# Patient Record
Sex: FEMALE | Race: WHITE | Employment: OTHER | URBAN - METROPOLITAN AREA
[De-identification: names, ages, dates, MRNs, and addresses within clinical notes are randomized per-mention and may not be internally consistent; named-entity substitution may affect disease eponyms.]

---

## 2019-03-05 ENCOUNTER — HOSPITAL ENCOUNTER (EMERGENCY)
Facility: CLINIC | Age: 64
Discharge: HOME | End: 2019-03-06
Attending: EMERGENCY MEDICINE | Admitting: EMERGENCY MEDICINE
Payer: MEDICARE

## 2019-03-05 VITALS
DIASTOLIC BLOOD PRESSURE: 61 MMHG | SYSTOLIC BLOOD PRESSURE: 120 MMHG | TEMPERATURE: 98.2 F | HEART RATE: 96 BPM | RESPIRATION RATE: 18 BRPM | BODY MASS INDEX: 32.14 KG/M2 | HEIGHT: 66 IN | WEIGHT: 200 LBS | OXYGEN SATURATION: 97 %

## 2019-03-05 DIAGNOSIS — K59.03 DRUG-INDUCED CONSTIPATION: ICD-10-CM

## 2019-03-05 PROCEDURE — 99283 EMERGENCY DEPT VISIT LOW MDM: CPT

## 2019-03-05 ASSESSMENT — MIFFLIN-ST. JEOR: SCORE: 1478.94

## 2019-03-05 NOTE — ED AVS SNAPSHOT
Emergency Department  [address] [place] [address]  Phone:  [phone]  Fax:  [phone]                                    Apurva Merida   MRN: [id number]    Department:   Emergency Department   Date of Visit:  3/5/2019           After Visit Summary Signature Page    I have received my discharge instructions, and my questions have been answered. I have discussed any challenges I see with this plan with the nurse or doctor.    ..........................................................................................................................................  Patient/Patient Representative Signature      ..........................................................................................................................................  Patient Representative Print Name and Relationship to Patient    ..................................................               ................................................  Date                                   Time    ..........................................................................................................................................  Reviewed by Signature/Title    ...................................................              ..............................................  Date                                               Time          22EPIC Rev 08/18

## 2019-03-06 RX ORDER — DIAZEPAM 10 MG/2ML
5 INJECTION, SOLUTION INTRAMUSCULAR; INTRAVENOUS ONCE
Status: DISCONTINUED | OUTPATIENT
Start: 2019-03-06 | End: 2019-03-06

## 2019-03-06 ASSESSMENT — ENCOUNTER SYMPTOMS
FEVER: 0
VOMITING: 0
NAUSEA: 0
CONSTIPATION: 1
ABDOMINAL PAIN: 0
CHILLS: 0

## 2019-03-06 NOTE — DISCHARGE INSTRUCTIONS
Please take miralax twice daily until your see your PCP in 2-3 days.  If you have good bowel movements you may titrate the miralax down to once a day.    Drink plenty of water.      Return to the ED if unable to tolerate PO, abdominal pain, intractable nausea or vomiting, fevers or other acute changes.      Discharge Instructions  Constipation  Constipation can cause severe cramping pain and your provider thinks this might be the cause of your abdominal pain (belly pain) today.  People usually recognize that they are constipated because they have difficulty having bowel movements, are not having bowel movements frequently enough, or are not having large enough bowel movements. Sometimes, especially in children or older people, you do not recognize that you are constipated until it becomes severe. The most common causes of constipation are a lack of exercise and not eating enough fruits, vegetables, and whole grains. Constipation can also be a side effect of medications, such as narcotics, or may be caused by a disease of the digestive system.    Generally, every Emergency Department visit should have a follow-up clinic visit with either a primary or a specialty clinic/provider. Please follow-up as instructed by your emergency provider today. Sometimes, chronic constipation requires further testing to determine the cause. If you are over 50 years old, you may need a colonoscopy if you have not had one before.     Return to the Emergency Department if:  Your abdominal pain worsens or does not improve after a bowel movement.  You become very weak.  You get a temperature above 102oF or as directed by your provider.  You have blood in your stools (bright red or black, tarry stools).  You keep vomiting (throwing up) or cannot drink liquids.  Your see blood when you vomit.  Your stomach gets bloated or bigger.  You have new symptoms or anything that worries you.    What can I do to help myself?  If your provider gave you a  cathartic medication, like magnesium citrate or GoLytely  (polyethylene glycol), you can expect to have cramps and gas pains after taking it. You can expect to have a number of bowel movements and even diarrhea (loose or watery stools) in the course of clearing your bowels.  You will know your bowels have been cleaned out after you pass clear liquid. The cramps and gas should let up after you have emptied your bowels. You may want to wait until morning to take this type of medication so you aren?t up in the night.   Sometimes instead of cathartics, we recommend laxatives like milk of magnesia to move your bowels more slowly, or an enema to help the bowels to move. Read and follow the package directions, or follow your provider?s instructions.  Once you have become very constipated, it takes time for your bowels to return to normal and you need to be very careful to prevent becoming constipated again. Take a laxative if you do not move your bowels at least every two days.     Eat foods that have a lot of fiber. Good choices are fruits, vegetables, prune juice, apple juice, and high fiber cereal. Limit dairy products such as milk and cheese, since these can make constipation worse.   Drink plenty of water.   When you feel the need to go to the bathroom, go to the bathroom. Do not hold it.  Miralax , Metamucil , Colace , Senna or fiber supplements can be used daily.  Miralax  daily is often the best choice for children.  If you were given a prescription for medicine here today, be sure to read all of the information (including the package insert) that comes with your prescription.  This will include important information about the medicine, its side effects, and any warnings that you need to know about.  The pharmacist who fills the prescription can provide more information and answer questions you may have about the medicine.  If you have questions or concerns that the pharmacist cannot address, please call or return  to the Emergency Department.   Remember that you can always come back to the Emergency Department if you are not able to see your regular provider in the amount of time listed above, if you get any new symptoms, or if there is anything that worries you.

## 2019-03-06 NOTE — ED PROVIDER NOTES
History     Chief Complaint:  Constipation       HPI   Apurva Merida is a 63 year old female with a history significant for chronic constipation and renal cancer on AFINITOR and 60 mg Oxycodone BID who presents with constipation for the past day. The patient reports that she passed a small amount of stool 4 days ago and again yesterday. Today the patient reports that she tried an enema at home as well as Miralax and Senna. She then sat on the toilet for one and a half hours without any success. Patient has been passing gas. The patient denies worsening abdominal pain from baseline. She has not had any nausea, vomiting, fevers, or chills. Of note, the patient has only presented to the emergency department one other time for evaluation of constipation.       Allergies:  Allopurinol   Claritin   Lisinopril   Xarelto [Rivaroxaban]   Zoloft      Medications:    Acetaminophen (TYLENOL PO)  ALBUTEROL  ALPRAZOLAM PO  Armodafinil (NUVIGIL PO)  CALCIUM ACETATE  Gabapentin (NEURONTIN PO)  LEVOTHYROXINE SODIUM PO  metaxalone (SKELAXIN) 800 MG tablet  Mirtazapine (REMERON PO)  Omega-3 Fatty Acids (OMEGA-3 FISH OIL PO)  OxyCODONE HCl (OXYCONTIN PO)  OXYCODONE HCL  Polyethylene Glycol 3350 (MIRALAX PO)  SENNA  TIZANIDINE HCL PO  ZYRTEC D    Past Medical History:    Fibromyalgia   Malignant neoplasm    Past Surgical History:    APPENDECTOMY   GENITOURINARY SURGERY   GYN SURGERY   ORTHOPEDIC SURGERY     Family History:    History reviewed. No pertinent family history.     Social History:  Smoking status: Former smoker  Alcohol use: No  Marital Status:  Single [1]       Review of Systems   Constitutional: Negative for chills and fever.   Gastrointestinal: Positive for constipation. Negative for abdominal pain, nausea and vomiting.   All other systems reviewed and are negative.      Physical Exam     Patient Vitals for the past 24 hrs:   BP Temp Temp src Pulse Resp SpO2 Height Weight   03/05/19 2324 120/61 98.2  F (36.8  C) Oral  "96 18 97 % 1.676 m (5' 6\") 90.7 kg (200 lb)         Physical Exam  General: Resting on the bed.  Head: No obvious trauma to head.  Ears, Nose, Throat:  External ears normal.  Nose normal.   Eyes:  Conjunctivae clear.    Neck: Normal range of motion.  Neck supple.   CV: Regular rate and rhythm.  No murmurs.      Respiratory: Effort normal and breath sounds normal.  No wheezing or crackles.   Gastrointestinal: Soft.  No distension. There is no tenderness.  There is no rigidity, no rebound and no guarding.   Skin: Skin is warm and dry.  No rash noted.   Rectal exam: normal external genitalia.  On digital rectal exam there is firm stools in the rectal vault.  Able to remove some they are causing some obstruction.  No fissures or bleeding hemorrhoids appreciable.    Emergency Department Course     Interventions:  0020: Pink lady enema per rectum      Emergency Department Course:  Past medical records, nursing notes, and vitals reviewed.  0010: I performed an exam of the patient and obtained history, as documented above.    0059: I rechecked the patient. Patient had a large BM and ready to go. Patient discharged home with instructions regarding supportive care, medications, and reasons to return. The importance of close follow-up was reviewed.       Impression & Plan      Medical Decision Making:  Apurva Merida is a 63 year old female who presents for evaluation for decreased stool output without signs of serious intraabdominal problems. Signs and symptoms are consistent with constipation. There are no signs of obstruction nor other intraabdominal catastrophe. There are no indications for advanced imaging or admission. After rectal disimpaction patient felt much improved. At this time I think that her constipation is primarily drug-induced secondary to her chronic opioid use.  Patient was able to have a large bowel movement following rectal examination and rectal stimulation.  She denies any vomiting, diarrhea, nausea " to suggest obstruction.  No fevers or chills to suggest sepsis or infectious etiology.  I am going to send them home with instructions on medication management of this. They will then start daily stool softener as well once they are more completely cleaned out. Patient is agreeable with this and questions are answered.     Diagnosis:    ICD-10-CM    1. Drug-induced constipation K59.03        Disposition:  discharged to home    Sumeet Yip  3/5/2019    EMERGENCY DEPARTMENT    Scribe Disclosure:  I, Sumeet Theodora, am serving as a scribe at 12:10 AM on 3/6/2019 to document services personally performed by Kassy Reddy MD based on my observations and the provider's statements to me.        Kassy Reddy MD  03/06/19 0835

## 2022-01-24 ENCOUNTER — LAB REQUISITION (OUTPATIENT)
Dept: LAB | Facility: CLINIC | Age: 67
End: 2022-01-24
Payer: MEDICARE

## 2022-01-24 DIAGNOSIS — R21 RASH AND OTHER NONSPECIFIC SKIN ERUPTION: ICD-10-CM

## 2022-01-24 PROCEDURE — 83516 IMMUNOASSAY NONANTIBODY: CPT | Mod: ORL | Performed by: DERMATOLOGY

## 2022-01-24 PROCEDURE — 36415 COLL VENOUS BLD VENIPUNCTURE: CPT | Mod: ORL | Performed by: DERMATOLOGY

## 2025-04-11 ENCOUNTER — HOSPITAL ENCOUNTER (EMERGENCY)
Facility: CLINIC | Age: 70
Discharge: HOME | End: 2025-04-11
Attending: EMERGENCY MEDICINE | Admitting: EMERGENCY MEDICINE
Payer: MEDICARE

## 2025-04-11 VITALS
SYSTOLIC BLOOD PRESSURE: 145 MMHG | RESPIRATION RATE: 24 BRPM | TEMPERATURE: 98.1 F | OXYGEN SATURATION: 98 % | HEART RATE: 97 BPM | DIASTOLIC BLOOD PRESSURE: 65 MMHG

## 2025-04-11 DIAGNOSIS — K59.00 CONSTIPATION, UNSPECIFIED CONSTIPATION TYPE: ICD-10-CM

## 2025-04-11 PROCEDURE — 99283 EMERGENCY DEPT VISIT LOW MDM: CPT

## 2025-04-11 ASSESSMENT — COLUMBIA-SUICIDE SEVERITY RATING SCALE - C-SSRS
1. IN THE PAST MONTH, HAVE YOU WISHED YOU WERE DEAD OR WISHED YOU COULD GO TO SLEEP AND NOT WAKE UP?: NO
2. HAVE YOU ACTUALLY HAD ANY THOUGHTS OF KILLING YOURSELF IN THE PAST MONTH?: NO
6. HAVE YOU EVER DONE ANYTHING, STARTED TO DO ANYTHING, OR PREPARED TO DO ANYTHING TO END YOUR LIFE?: NO

## 2025-04-11 ASSESSMENT — ACTIVITIES OF DAILY LIVING (ADL): ADLS_ACUITY_SCORE: 41

## 2025-04-12 NOTE — ED NOTES
"Pt ambulated self to bathroom after arrival from EMS, steady gait. Approached nursing station to report \"made a mess.\" Upon assessement, pt had large, formed continent BM.   "

## 2025-04-12 NOTE — ED TRIAGE NOTES
BIBA from home for abd pain 8-9/10 and constipation. Active hx renal CA. Required 150 mg fentanyl in ambulance for improvement. Pt reports passing small hard stool this evening after self-administering fleets enema, approx 1800. , 259; pt took 50 unit(s) novolog prior to arrival; pt reports this is a normal dose. Alert and oriented, able to make needs known.     Triage Assessment (Adult)       Row Name 04/11/25 8138          Triage Assessment    Airway WDL WDL        Respiratory WDL    Respiratory WDL WDL        Cardiac WDL    Cardiac WDL WDL        Cognitive/Neuro/Behavioral WDL    Cognitive/Neuro/Behavioral WDL WDL

## 2025-04-12 NOTE — ED PROVIDER NOTES
Emergency Department Note      History of Present Illness     Chief Complaint   Abdominal Pain    HPI   Apurva Merida is a 69 year old female with a history of renal cancer, chronic kidney disease, hyperlipidemia, type 2 diabetes mellitus, and asthma, presenting to the emergency department for evaluation of abdominal pain. The patient reports she has been constipated recently, but had a bowel movement here at the emergency department and reports that her symptoms have resolved. She states that she used a fleet enema at home which did not provide her any relief at home. She also used miralax yesterday. She denies any fevers, vomiting, dysuria, hematuria, or polyuria. She reports that she frequently becomes constipated due to her pain medication.     Independent Historian   None    Past Medical History     Medical History and Problem List   Renal cancer   Chronic kidney disease   Hyperlipidemia   Type 2 diabetes mellitus   Asthma   Fibromyalgia   Hypothyroidism     Medications   Albuterol   Nuvigil PO  Neurontin PO  Levothyroxine sodium  Skelaxin   Remeron  Oxycodone   Senna   Tizanidine     Surgical History   Appendectomy    surgery  GYN surgery   Orthopedic surgery     Physical Exam     Patient Vitals for the past 24 hrs:   BP Temp Temp src Pulse Resp SpO2   04/11/25 2118 (!) 151/75 98.2  F (36.8  C) Oral 98 22 98 %     Physical Exam  General: Sitting up in bed  Eyes:  The pupils are equal and round    Conjunctivae and sclerae are normal  ENT:    Atraumatic face  Neck:  Normal range of motion  CV:  Regular rate, regular rhythm     Skin warm and well perfused   Resp:  Non labored breathing on room air    No tachypnea    No cough heard    Lungs clear bilaterally  GI:  Abdomen is soft, there is no rigidity    No distension    No rebound tenderness     No abdominal tenderness  MS:  Normal muscular tone  Skin:  No rash or acute skin lesions noted  Neuro:   Awake, alert.      Speech is normal and fluent.    Face  is symmetric.     Moves all extremities equally  Psych: Normal affect.  Appropriate interactions.    ED Course      Procedures   Procedures     Discussion of Management   None    ED Course   ED Course as of 04/11/25 2228 Fri Apr 11, 2025 2221 I obtained history and examined the patient as noted above.      Additional Documentation  None    Medical Decision Making / Diagnosis     RAUL Merida is a 69 year old female who presents for evaluation for decreased stool output. Signs and symptoms are consistent with constipation. Patient was trying to manage at home but came in when she could not have a significant bowel movement at home. When she got to ED, she had significant bowel movement which resolved her pain. Given resolution of pain and large bowel movement in ED, she did not think that any labs or imaging needed to be obtained and would like to go home. There are no indications for advanced imaging or admission.  She will continue bowel management at home.     Disposition   The patient was discharged.     Diagnosis     ICD-10-CM    1. Constipation, unspecified constipation type  K59.00          Scribe Disclosure:  I, Francisco Dyson, am serving as a scribe at 10:27 PM on 4/11/2025 to document services personally performed by Zoila Hernandez MD based on my observations and the provider's statements to me.        Zoila Hernandez MD  04/11/25 6652

## 2025-04-12 NOTE — ED NOTES
Bed: ED08  Expected date:   Expected time:   Means of arrival:   Comments:  HEMS 425 71F abdominal pain cancer patient eta 2044